# Patient Record
Sex: MALE
[De-identification: names, ages, dates, MRNs, and addresses within clinical notes are randomized per-mention and may not be internally consistent; named-entity substitution may affect disease eponyms.]

---

## 2018-06-19 ENCOUNTER — HOSPITAL ENCOUNTER (OUTPATIENT)
Dept: HOSPITAL 90 - DAH | Age: 73
Discharge: HOME | End: 2018-06-19
Attending: GENERAL PRACTICE
Payer: OTHER GOVERNMENT

## 2018-06-19 DIAGNOSIS — E11.9: ICD-10-CM

## 2018-06-19 DIAGNOSIS — E78.4: ICD-10-CM

## 2018-06-19 DIAGNOSIS — I25.10: ICD-10-CM

## 2018-06-19 DIAGNOSIS — M47.896: Primary | ICD-10-CM

## 2018-06-19 DIAGNOSIS — I10: ICD-10-CM

## 2018-06-19 PROCEDURE — 72100 X-RAY EXAM L-S SPINE 2/3 VWS: CPT

## 2018-11-16 ENCOUNTER — HOSPITAL ENCOUNTER (OUTPATIENT)
Dept: HOSPITAL 90 - SHCH | Age: 73
Discharge: HOME | End: 2018-11-16
Attending: INTERNAL MEDICINE
Payer: OTHER GOVERNMENT

## 2018-11-16 VITALS — WEIGHT: 178 LBS | BODY MASS INDEX: 29.66 KG/M2 | HEIGHT: 65 IN

## 2018-11-16 DIAGNOSIS — I25.10: Primary | ICD-10-CM

## 2018-11-16 PROCEDURE — 96374 THER/PROPH/DIAG INJ IV PUSH: CPT

## 2018-11-16 PROCEDURE — 93017 CV STRESS TEST TRACING ONLY: CPT

## 2018-11-16 PROCEDURE — 78452 HT MUSCLE IMAGE SPECT MULT: CPT

## 2024-11-06 ENCOUNTER — HOSPITAL ENCOUNTER (OUTPATIENT)
Dept: HOSPITAL 90 - SHCH | Age: 79
Discharge: HOME | End: 2024-11-06
Attending: INTERNAL MEDICINE
Payer: COMMERCIAL

## 2024-11-06 DIAGNOSIS — I25.10: Primary | ICD-10-CM

## 2024-11-06 PROCEDURE — 93306 TTE W/DOPPLER COMPLETE: CPT

## 2024-11-14 ENCOUNTER — HOSPITAL ENCOUNTER (OUTPATIENT)
Dept: HOSPITAL 90 - LAB | Age: 79
Discharge: HOME | End: 2024-11-14
Attending: INTERNAL MEDICINE
Payer: COMMERCIAL

## 2024-11-14 DIAGNOSIS — I25.118: Primary | ICD-10-CM

## 2024-11-14 LAB
BUN SERPL-MCNC: 21 MG/DL (ref 7–18)
CHLORIDE SERPL-SCNC: 104 MMOL/L (ref 101–111)
CO2 SERPL-SCNC: 29 MMOL/L (ref 21–32)
CREAT SERPL-MCNC: 1.2 MG/DL (ref 0.5–1.3)
GFR SERPL CREATININE-BSD FRML MDRD: 62 ML/MIN (ref 90–?)
GLUCOSE SERPL-MCNC: 119 MG/DL (ref 70–105)
POTASSIUM SERPL-SCNC: 4.3 MMOL/L (ref 3.5–5.1)
SODIUM SERPL-SCNC: 142 MMOL/L (ref 136–145)

## 2024-11-14 PROCEDURE — 36415 COLL VENOUS BLD VENIPUNCTURE: CPT

## 2024-11-14 PROCEDURE — 80048 BASIC METABOLIC PNL TOTAL CA: CPT

## 2024-12-03 ENCOUNTER — HOSPITAL ENCOUNTER (OUTPATIENT)
Dept: HOSPITAL 90 - RAH | Age: 79
Discharge: HOME | End: 2024-12-03
Attending: INTERNAL MEDICINE
Payer: COMMERCIAL

## 2024-12-03 DIAGNOSIS — M47.815: ICD-10-CM

## 2024-12-03 DIAGNOSIS — I25.118: Primary | ICD-10-CM

## 2024-12-03 PROCEDURE — 75574 CT ANGIO HRT W/3D IMAGE: CPT

## 2024-12-03 NOTE — [facility]
CT CARDIAC ANGIO W/CONT. CCTA



HISTORY:   Atherosclerotic heart disease



COMPARISON: None



TECHNIQUE: Multiple sequential axial images of the chest were obtained

along with the CT angiogram of the chest study. Patient was given 100 cc

of Omnipaque through intravenous route.



FINDINGS: There is no evidence of pulmonary nodule or parenchymal

disease.  No pleural effusion or pericardial effusion is seen. There is

no evidence of pneumothorax. There are normal size mediastinal and hilar

lymph nodes. The heart is borderline enlarged. Degenerative changes of

the thoracolumbar spine are present.



IMPRESSION: 

1. No evidence of pulmonary nodule or effusion is seen.  Please see CT

angiogram report of coronary arteries.

## 2024-12-09 NOTE — CARDIOLOGY
RAD REPORT: CORNARY CT ANGIO


                    RADIOLOGY REPORT: CORONARY CT ANGIOGRAPHY





DATE: Dec 9, 2024


QUALITY: Excellent


CLINICAL HISTORY AND INDICATION:


[ abnormal stress test ]





TECHNIQUE:


After obtaining a preliminary  image, contrast imaging performed on an 

[employer] [id number] scanner.  A dedicated, limited window, coronary imaging 

protocol was used, with single breath-hold, retrospective ECG gating, and 

automated arrhythmia rejection.  100 cc of low osmolar contrast agent: Omnipaque

350 was delivered via a 18-gauge IV catheter in the right antecubital fossa, 

using a power injector and followed by 60 cc of normal saline bolus as a chaser.

 Collimated images were reformatted at 0.5 mm intervals, and sent to an offline 

independent workstation for interpretation, using 3D anatomic reconstructions: 

Curved multiplanar reconstructions, maximum intensity projections, and 

multiplanar imaging.  10 mg IV metoprolol was administered prior to scanning.  

0.8 mg SL nitroglycerin was given.








CORONARY ARTERY DESCRIPTIONS:


The coronary arteries arise in normal position. 


Left main coronary artery: Normal caliber vessel that bifurcates into the LAD 

and LCx. There is calcified plaque in the ostial left main with <20% stenosis.


Left anterior descending coronary artery: Normal caliber vessel and gives rise 

to diagonal and septal branches. There is calcified plaque in the proximal and 

mid LAD, both with 20-30% stenosis.


Left circumflex coronary artery: Normal caliber, nondominant and gives rise to a

large OM 1 with superior and inferior branches. There is calcified plaque in the

proximal LCx with 20-30% stenosis.


Right coronary artery: Large, dominant vessel giving rise to the PL and PDA 

branches.  There is mixed calcified and noncalcified plaque in the ostial RCA 

with 30-40% stenosis.








CAD-RADs: 2, mild non obstructive CAD.





Thoracic Aorta: Normal diameter.





Kimberley Hays MD


Cardiovascular Disease


[facility]











KIMBERLEY HAYS MD             Dec 9, 2024 07:54